# Patient Record
Sex: FEMALE | Race: WHITE | NOT HISPANIC OR LATINO | Employment: UNEMPLOYED | URBAN - METROPOLITAN AREA
[De-identification: names, ages, dates, MRNs, and addresses within clinical notes are randomized per-mention and may not be internally consistent; named-entity substitution may affect disease eponyms.]

---

## 2021-08-19 ENCOUNTER — OFFICE VISIT (OUTPATIENT)
Dept: OBGYN | Facility: CLINIC | Age: 26
End: 2021-08-19
Payer: COMMERCIAL

## 2021-08-19 VITALS
DIASTOLIC BLOOD PRESSURE: 67 MMHG | SYSTOLIC BLOOD PRESSURE: 102 MMHG | HEART RATE: 69 BPM | BODY MASS INDEX: 20.84 KG/M2 | WEIGHT: 145.6 LBS | HEIGHT: 70 IN

## 2021-08-19 DIAGNOSIS — Z12.4 SCREENING FOR CERVICAL CANCER: ICD-10-CM

## 2021-08-19 DIAGNOSIS — Z13.1 SCREENING FOR DIABETES MELLITUS: ICD-10-CM

## 2021-08-19 DIAGNOSIS — Z13.220 LIPID SCREENING: ICD-10-CM

## 2021-08-19 DIAGNOSIS — Z30.432 ENCOUNTER FOR IUD REMOVAL: ICD-10-CM

## 2021-08-19 DIAGNOSIS — Z11.3 SCREEN FOR STD (SEXUALLY TRANSMITTED DISEASE): ICD-10-CM

## 2021-08-19 DIAGNOSIS — Z30.09 GENERAL COUNSELING FOR PRESCRIPTION OF ORAL CONTRACEPTIVES: ICD-10-CM

## 2021-08-19 DIAGNOSIS — Z13.29 SCREENING FOR THYROID DISORDER: ICD-10-CM

## 2021-08-19 DIAGNOSIS — Z00.00 ANNUAL PHYSICAL EXAM: Primary | ICD-10-CM

## 2021-08-19 DIAGNOSIS — Z00.00 ROUTINE GENERAL MEDICAL EXAMINATION AT A HEALTH CARE FACILITY: ICD-10-CM

## 2021-08-19 LAB
CLUE CELLS: ABNORMAL
TRICHOMONAS, WET PREP: ABNORMAL
WBC'S/HIGH POWER FIELD, WET PREP: ABNORMAL
YEAST, WET PREP: ABNORMAL

## 2021-08-19 PROCEDURE — G0145 SCR C/V CYTO,THINLAYER,RESCR: HCPCS | Performed by: OBSTETRICS & GYNECOLOGY

## 2021-08-19 PROCEDURE — 87210 SMEAR WET MOUNT SALINE/INK: CPT | Performed by: OBSTETRICS & GYNECOLOGY

## 2021-08-19 PROCEDURE — 87491 CHLMYD TRACH DNA AMP PROBE: CPT | Performed by: OBSTETRICS & GYNECOLOGY

## 2021-08-19 PROCEDURE — 99385 PREV VISIT NEW AGE 18-39: CPT | Mod: 25 | Performed by: OBSTETRICS & GYNECOLOGY

## 2021-08-19 PROCEDURE — 87591 N.GONORRHOEAE DNA AMP PROB: CPT | Performed by: OBSTETRICS & GYNECOLOGY

## 2021-08-19 PROCEDURE — 58301 REMOVE INTRAUTERINE DEVICE: CPT | Performed by: OBSTETRICS & GYNECOLOGY

## 2021-08-19 RX ORDER — LEVONORGESTREL/ETHIN.ESTRADIOL 0.1-0.02MG
1 TABLET ORAL DAILY
Qty: 84 TABLET | Refills: 3 | Status: SHIPPED | OUTPATIENT
Start: 2021-08-19

## 2021-08-19 ASSESSMENT — PAIN SCALES - GENERAL: PAINLEVEL: NO PAIN (0)

## 2021-08-19 ASSESSMENT — MIFFLIN-ST. JEOR: SCORE: 1486.94

## 2021-08-19 NOTE — PATIENT INSTRUCTIONS
If you have any questions regarding your visit, Please contact your care team.    [facility]: [phone]      Women [facility] CLINIC HOURS TELEPHONE NUMBER   Otilia Cade DO.    Amanda -  Maye -     BRIGHT Schmitt RN     Monday, Wednesday, Thursday and Friday[facility]  8:30a.m-5:00 p.m   [facility]  [address] [place] [address]  [phone] ask for [facility]    Imaging [phone]       Urgent Care locations:    [facility] Saturday and Sunday   9 am - 5 pm    Monday-Friday   11 pm - 7 pm  Saturday and Sunday   9 am - 5 pm   [phone]     [facility] Labor and Delivery:  [phone]    If you need a medication refill, please contact your pharmacy. Please allow 3 business days for your refill to be completed.  As always, Thank you for trusting us with your healthcare needs!    Preventive Health Recommendations  Female Ages 21 to 25     Yearly exam:     See your health care provider every year in order to  o Review health changes.   o Discuss preventive care.    o Review your medicines if your doctor has prescribed any.      You should be tested each year for STDs (sexually transmitted diseases).       Talk to your provider about how often you should have cholesterol testing.      Get a Pap test every three years. If you have an abnormal result, your doctor may have you test more often.      If you are at risk for diabetes, you should have a diabetes test (fasting glucose).     Shots:     Get a flu shot each year.     Get a tetanus shot every 10 years.     Consider getting the shot (vaccine) that prevents cervical cancer (Gardasil).    Nutrition:     Eat at least 5 servings of fruits and vegetables each day.    Eat whole-grain bread, whole-wheat pasta and brown rice instead of white grains and  rice.    Get adequate Calcium and Vitamin D.     Lifestyle    Exercise at least 150 minutes a week each week (30 minutes a day, 5 days a week). This will help you control your weight and prevent disease.    Limit alcohol to one drink per day.    No smoking.     Wear sunscreen to prevent skin cancer.    See your dentist every six months for an exam and cleaning.

## 2021-08-20 LAB
C TRACH DNA SPEC QL NAA+PROBE: NEGATIVE
N GONORRHOEA DNA SPEC QL NAA+PROBE: NEGATIVE

## 2021-08-20 NOTE — PROGRESS NOTES
"Dayami is a 26 year old female, , LMP 2021, who is here for her annual exam and removal of her current IUD.  She had a ParaGard IUD inserted in 2015 for contraception but dislikes the uterine cramping that she feels it is causing and would like to go back to the BCP.  However, she did not really like Apri in the past and requests a script for a lower dose BCP.  Since she is not in a fasting state this morning, will have her return for labwork.  She is due for a pap smear and would like to discuss her decreased libido issue.  She also requests STD screening for GC, chlamydia, and trichomonas since she just broke up with her boyfriend after a 4-year relationship.  She describes that relationship as \"emotionally toxic\" but denies any safety concerns at this time.    ROS: Ten point review of systems was reviewed and negative except the above.    Health Maintenance   Topic Date Due     PREVENTIVE CARE VISIT  Never done     ADVANCE CARE PLANNING  Never done     HIV SCREENING  Never done     HEPATITIS C SCREENING  Never done     PAP  Never done     DTAP/TDAP/TD IMMUNIZATION (3 - Td or Tdap) 2018     PHQ-2  Never done     INFLUENZA VACCINE (1) 2021     HPV IMMUNIZATION  Completed     MENINGITIS IMMUNIZATION  Completed     COVID-19 Vaccine  Completed     Pneumococcal Vaccine: Pediatrics (0 to 5 Years) and At-Risk Patients (6 to 64 Years)  Aged Out     IPV IMMUNIZATION  Aged Out     HEPATITIS B IMMUNIZATION  Aged Out      Last pap: due  Last Mammogram: not due  Last Dexa: not due  Last Colonoscopy: not due  No results found for: CHOL  No results found for: HDL  No results found for: LDL  No results found for: TRIG  No results found for: CHOLHDLRATIO    OBHX:      PSH: History reviewed. No pertinent surgical history.    PMH: Her past medical, surgical, and obstetric histories were reviewed and are documented in their appropriate chart areas.    ALL/Meds: Her medication and allergy histories were " "reviewed and are documented in their appropriate chart areas.    SH/FMH: Her social and family history was reviewed and documented in its appropriate chart area.    PE: /67 (BP Location: Right arm, Patient Position: Chair, Cuff Size: Adult Regular)   Pulse 69   Ht 1.78 m (5' 10.08\")   Wt 66 kg (145 lb 9.6 oz)   LMP 07/30/2021 (Exact Date)   Breastfeeding No   BMI 20.84 kg/m    Body mass index is 20.84 kg/m .    General Appearance:  healthy, alert, active, no distress  Cardiovascular:  Regular rate and Rhythm without murmur  Neck: Supple, no adenopathy and thyroid normal  Lungs:  Clear, without wheeze, rale or rhonchi  Breast: normal breast exam  Abdomen: Benign, Soft, flat, non-tender, No masses, organomegaly, No inguinal nodes and Bowel sounds normoactive.   Pelvic:       - Ext: Vulva and perineum are normal without lesion, mass or discharge        - Urethra: normal without discharge        - Urethral Meatus: normal appearance       - Bladder: no tenderness, no masses       - Vagina: Normal mucosa, no discharge        - Cervix: normal and nulliparous       - Uterus:Normal shape, position and consistency        - Adnexa: Normal without masses or tenderness       - Rectal: deferred    Informed consent was reviewed and obtained for IUD removal.  A bi-valve speculum was placed while she was in the dorsal lithotomy position.  The cervix was cleansed with betadine swabs x 3 and the 2 IUD strings were easily visible.  These strings were grasped with a Rings forcep and the ParaGard IUD was removed without difficulty or complication.  All instruments were removed and counts were correct.  EBL was 0 and follow up instructions were reviewed.    A/P:  Well Woman Exam, IUD Removal, Contraceptive Consult, Decreased Libido, STD Screening     -  I discussed the new pap recommendations regarding screening.  Explained the rationale for increased intervals between paps.  Questions asked and answered.  She does agree to " "this regiment.  Pap was collected and submitted   -  BC: We discussed all her contraceptive options but she preferred a low-dose BCP.  She stated that she would not consider Depo Provera or Nexplanon.     -  We discussed her decreased libido issue but she admits that this only developed while in her recent relationship which she termed \"emotionally toxic.\"  She would like to give more time to determine if this is going to be an ongoing problem or if it resolves when in a new relationship.  She denies any safety concerns.   -  Check future labwork in a fasting state.   -  STD screening collected and submitted but she only wanted to be checked for GC, chlamydia, and trichomonas.  Safe sex measures were discussed.   -  Encouraged self-breast exam   -  Encouraged low fat diet, regular exercise, and adequate calcium intake.    Otilia Cade, DO  FACOG, FACS    "

## 2021-08-23 LAB
BKR LAB AP GYN ADEQUACY: NORMAL
BKR LAB AP GYN INTERPRETATION: NORMAL
BKR LAB AP HPV REFLEX: NORMAL
BKR LAB AP PREVIOUS ABNORMAL: NORMAL
PATH REPORT.COMMENTS IMP SPEC: NORMAL
PATH REPORT.RELEVANT HX SPEC: NORMAL

## 2022-05-03 ENCOUNTER — OFFICE VISIT (OUTPATIENT)
Dept: FAMILY MEDICINE | Facility: CLINIC | Age: 27
End: 2022-05-03
Payer: COMMERCIAL

## 2022-05-03 VITALS
DIASTOLIC BLOOD PRESSURE: 76 MMHG | OXYGEN SATURATION: 99 % | TEMPERATURE: 98.1 F | WEIGHT: 150 LBS | SYSTOLIC BLOOD PRESSURE: 119 MMHG | RESPIRATION RATE: 16 BRPM | HEART RATE: 76 BPM | BODY MASS INDEX: 21.47 KG/M2

## 2022-05-03 DIAGNOSIS — F41.0 PANIC ATTACK: ICD-10-CM

## 2022-05-03 DIAGNOSIS — G44.51 HEADACHE, HEMICRANIA CONTINUA: ICD-10-CM

## 2022-05-03 DIAGNOSIS — Z86.69 HX OF MIGRAINE HEADACHES: Primary | ICD-10-CM

## 2022-05-03 DIAGNOSIS — G44.219 EPISODIC TENSION-TYPE HEADACHE, NOT INTRACTABLE: ICD-10-CM

## 2022-05-03 DIAGNOSIS — J06.9 VIRAL URI: ICD-10-CM

## 2022-05-03 PROCEDURE — U0003 INFECTIOUS AGENT DETECTION BY NUCLEIC ACID (DNA OR RNA); SEVERE ACUTE RESPIRATORY SYNDROME CORONAVIRUS 2 (SARS-COV-2) (CORONAVIRUS DISEASE [COVID-19]), AMPLIFIED PROBE TECHNIQUE, MAKING USE OF HIGH THROUGHPUT TECHNOLOGIES AS DESCRIBED BY CMS-2020-01-R: HCPCS | Performed by: NURSE PRACTITIONER

## 2022-05-03 PROCEDURE — U0005 INFEC AGEN DETEC AMPLI PROBE: HCPCS | Performed by: NURSE PRACTITIONER

## 2022-05-03 PROCEDURE — 99203 OFFICE O/P NEW LOW 30 MIN: CPT | Performed by: NURSE PRACTITIONER

## 2022-05-03 RX ORDER — CLONAZEPAM 0.5 MG/1
0.5 TABLET ORAL DAILY PRN
Qty: 20 TABLET | Refills: 1 | Status: SHIPPED | OUTPATIENT
Start: 2022-05-03

## 2022-05-03 RX ORDER — SUMATRIPTAN 50 MG/1
50 TABLET, FILM COATED ORAL
Qty: 20 TABLET | Refills: 1 | Status: SHIPPED | OUTPATIENT
Start: 2022-05-03

## 2022-05-03 ASSESSMENT — PAIN SCALES - GENERAL: PAINLEVEL: NO PAIN (0)

## 2022-05-03 NOTE — PROGRESS NOTES
Assessment & Plan     Hx of migraine headaches    - Symptomatic; Unknown COVID-19 Virus (Coronavirus) by PCR Nose; Future  - SUMAtriptan (IMITREX) 50 MG tablet; Take 1 tablet (50 mg) by mouth at onset of headache for migraine May repeat in 2 hours. Max 4 tablets/24 hours.  - Symptomatic; Unknown COVID-19 Virus (Coronavirus) by PCR Nose    Episodic tension-type headache, not intractable    - Symptomatic; Unknown COVID-19 Virus (Coronavirus) by PCR Nose; Future  - SUMAtriptan (IMITREX) 50 MG tablet; Take 1 tablet (50 mg) by mouth at onset of headache for migraine May repeat in 2 hours. Max 4 tablets/24 hours.  - Symptomatic; Unknown COVID-19 Virus (Coronavirus) by PCR Nose    Viral URI    - Symptomatic; Unknown COVID-19 Virus (Coronavirus) by PCR Nose; Future  - Symptomatic; Unknown COVID-19 Virus (Coronavirus) by PCR Nose    Headache, hemicrania continua    - Indomethacin 40 MG CAPS; Take 40 mg by mouth 2 times daily as needed (headache- take with food)    Panic attack    - clonazePAM (KLONOPIN) 0.5 MG tablet; Take 1 tablet (0.5 mg) by mouth daily as needed (for panic attack only)    30 minutes spent on the date of the encounter doing chart review, history and exam, documentation and further activities per the note       See Patient Instructions: follow up if worsening symptoms.  If headache returns/ persists, let me know and I will order brain MRI.  Take medication as prescribed/ needed.  Review AVS tips.  We will let you know COVID test results. Pt in agreement    Return in about 4 weeks (around 5/31/2022), or if symptoms worsen or fail to improve.    Brielle Amaya, ELGIN  [facility] HANANE Stock is a 26 year old who presents for the following health issues     Hx of headaches/ migraines.  Excedrin/ ibuprofen usually works to get rid of them.  Currently they are useless.  Normal headaches last up to 2 days, and are a dull ache.   This headache has been for 5 days, predominantly on  "left side, did move to R side one day. Headache is throbbing/ stabbing. She did have a little runny nose, ST, nasal congestion, cold symptoms have pretty much resolved.  Is under a lot of stress, mental health has improved since she started working with a therapist; started a new job-  [employer].  Denies vision changes, no one sided weakness. Occasional panic attacks/ vision caving in feeling. Lives with family. Occasional nausea.     History of Present Illness       Migraines:   Since the patient's last clinic visit, headaches are: worsened  The patient is getting headaches:  Once or twice a month  She is able to do normal daily activities when she has a migraine.  The patient is taking the following rescue/relief medications:  Ibuprofen (Advil, Motrin), Tylenol and Excedrin   Patient states \"I get no relief\" from the rescue/relief medications.   The patient is taking the following medications to prevent migraines:  No medications to prevent migraines  In the past 4 weeks, the patient has gone to an Urgent Care or Emergency Room 0 times times due to headaches.    She eats 0-1 servings of fruits and vegetables daily.She consumes 1 sweetened beverage(s) daily.She exercises with enough effort to increase her heart rate 60 or more minutes per day.  She exercises with enough effort to increase her heart rate 5 days per week.   She is taking medications regularly.      Review of Systems   Constitutional, HEENT, cardiovascular, pulmonary, GI, , musculoskeletal, neuro, skin, endocrine and psych systems are negative, except as otherwise noted.      Objective    /76   Pulse 76   Temp 98.1  F (36.7  C) (Tympanic)   Resp 16   Wt 68 kg (150 lb)   SpO2 99%   BMI 21.47 kg/m    Body mass index is 21.47 kg/m .  Physical Exam   GENERAL: Healthy, alert and no distress  EYES: Eyes grossly normal to inspection.  No discharge or erythema, or obvious scleral/conjunctival abnormalities.  RESP: No audible " wheeze, cough, or visible cyanosis.  No visible retractions or increased work of breathing.    SKIN: Visible skin clear. No significant rash, abnormal pigmentation or lesions.  NEURO: Cranial nerves grossly intact.  Mentation and speech appropriate for age.  PSYCH: Mentation appears normal, affect normal, judgement and insight intact, normal speech and appearance well-groomed. POSITIVE crying throughout visit.     See orders

## 2022-05-04 LAB — SARS-COV-2 RNA RESP QL NAA+PROBE: NEGATIVE

## 2022-05-04 NOTE — RESULT ENCOUNTER NOTE
John Stock,    Thank you for your recent office visit.    Here are your recent results.  Negative for COVID    Feel free to contact me via [employer] or call the clinic at [phone].    Sincerely,    JUAN J Nielson, FNP-BC

## 2022-06-25 ENCOUNTER — HEALTH MAINTENANCE LETTER (OUTPATIENT)
Age: 27
End: 2022-06-25

## 2022-11-20 ENCOUNTER — HEALTH MAINTENANCE LETTER (OUTPATIENT)
Age: 27
End: 2022-11-20

## 2023-07-02 ENCOUNTER — HEALTH MAINTENANCE LETTER (OUTPATIENT)
Age: 28
End: 2023-07-02

## 2024-08-25 ENCOUNTER — HEALTH MAINTENANCE LETTER (OUTPATIENT)
Age: 29
End: 2024-08-25